# Patient Record
Sex: FEMALE | Race: OTHER | NOT HISPANIC OR LATINO | Employment: UNEMPLOYED | URBAN - METROPOLITAN AREA
[De-identification: names, ages, dates, MRNs, and addresses within clinical notes are randomized per-mention and may not be internally consistent; named-entity substitution may affect disease eponyms.]

---

## 2024-11-14 ENCOUNTER — LAB (OUTPATIENT)
Dept: LAB | Facility: LAB | Age: 49
End: 2024-11-14

## 2024-11-14 ENCOUNTER — APPOINTMENT (OUTPATIENT)
Dept: PRIMARY CARE | Facility: CLINIC | Age: 49
End: 2024-11-14

## 2024-11-14 VITALS
OXYGEN SATURATION: 96 % | HEIGHT: 65 IN | HEART RATE: 95 BPM | WEIGHT: 237 LBS | SYSTOLIC BLOOD PRESSURE: 138 MMHG | BODY MASS INDEX: 39.49 KG/M2 | TEMPERATURE: 98 F | DIASTOLIC BLOOD PRESSURE: 88 MMHG

## 2024-11-14 DIAGNOSIS — Z02.89 HISTORY AND PHYSICAL EXAMINATION, IMMIGRATION: Primary | ICD-10-CM

## 2024-11-14 DIAGNOSIS — Z02.89 HISTORY AND PHYSICAL EXAMINATION, IMMIGRATION: ICD-10-CM

## 2024-11-14 LAB — TREPONEMA PALLIDUM IGG+IGM AB [PRESENCE] IN SERUM OR PLASMA BY IMMUNOASSAY: NONREACTIVE

## 2024-11-14 PROCEDURE — 87491 CHLMYD TRACH DNA AMP PROBE: CPT

## 2024-11-14 PROCEDURE — 36415 COLL VENOUS BLD VENIPUNCTURE: CPT

## 2024-11-14 PROCEDURE — 86780 TREPONEMA PALLIDUM: CPT

## 2024-11-14 PROCEDURE — IMPHY PR IMMIGRATION PHYSICALS: Performed by: INTERNAL MEDICINE

## 2024-11-14 PROCEDURE — 86481 TB AG RESPONSE T-CELL SUSP: CPT

## 2024-11-14 PROCEDURE — 87591 N.GONORRHOEAE DNA AMP PROB: CPT

## 2024-11-14 PROCEDURE — 86787 VARICELLA-ZOSTER ANTIBODY: CPT

## 2024-11-14 ASSESSMENT — ENCOUNTER SYMPTOMS
DIARRHEA: 0
SHORTNESS OF BREATH: 0
CHILLS: 0
WEAKNESS: 0
MYALGIAS: 0
ACTIVITY CHANGE: 0
SORE THROAT: 0
AGITATION: 0
SINUS PRESSURE: 0
PALPITATIONS: 0
CHEST TIGHTNESS: 0
NAUSEA: 0

## 2024-11-14 NOTE — PROGRESS NOTES
Subjective   Patient ID: Citlali Montemayor is a 49 y.o. female who presents for immigration physical assessment.  denies fever, cough, rash, chest pain, diarrhea, or any other symptoms.  denies any recent hospitalizations or institutionalizations.  They deny any psychoactive drug or alcohol use, as well as any issues with psychiatric illnesses.  There is no indication of harmful mental or physical disorders.    HPI    Review of Systems   Constitutional:  Negative for activity change and chills.   HENT:  Negative for congestion, sinus pressure and sore throat.    Respiratory:  Negative for chest tightness and shortness of breath.    Cardiovascular:  Negative for chest pain and palpitations.   Gastrointestinal:  Negative for diarrhea and nausea.   Musculoskeletal:  Negative for myalgias.   Neurological:  Negative for weakness.   Psychiatric/Behavioral:  Negative for agitation and behavioral problems.          Visit Vitals  /88 (BP Location: Right arm, Patient Position: Sitting, BP Cuff Size: Large adult)   Pulse 95   Temp 36.7 °C (98 °F)      Objective   Physical Exam  Constitutional:       Appearance: Normal appearance.   HENT:      Head: Normocephalic and atraumatic.      Nose: Nose normal.      Mouth/Throat:      Mouth: Mucous membranes are moist.   Eyes:      Extraocular Movements: Extraocular movements intact.      Pupils: Pupils are equal, round, and reactive to light.   Cardiovascular:      Rate and Rhythm: Normal rate and regular rhythm.   Pulmonary:      Effort: No respiratory distress.      Breath sounds: No wheezing.   Abdominal:      General: Bowel sounds are normal.      Palpations: Abdomen is soft.   Neurological:      General: No focal deficit present.       Assessment/Plan   Assessment & Plan  History and physical examination, immigration    Orders:  •  Varicella Zoster Antibody, IgG; Future  •  T-Spot TB; Future  •  Syphilis Screen with Reflex; Future  •  C. trachomatis / N. gonorrhoeae,  Amplified; Future    The patient is medically cleared.  There is no evidence of any issues related to intelligence, thought, cognition, judgment, affect, or behavior.  No sign of infectious processes.  Will draw screening laboratories as ordered.

## 2024-11-14 NOTE — ASSESSMENT & PLAN NOTE
Orders:    Varicella Zoster Antibody, IgG; Future    T-Spot TB; Future    Syphilis Screen with Reflex; Future    C. trachomatis / N. gonorrhoeae, Amplified; Future

## 2024-11-15 LAB
C TRACH RRNA SPEC QL NAA+PROBE: NEGATIVE
N GONORRHOEA DNA SPEC QL PROBE+SIG AMP: NEGATIVE
VARICELLA ZOSTER IGG INDEX: 2 IA
VZV IGG SER QL IA: POSITIVE

## 2024-11-16 LAB
NIL(NEG) CONTROL SPOT COUNT: NORMAL
PANEL A SPOT COUNT: 0
PANEL B SPOT COUNT: 0
POS CONTROL SPOT COUNT: NORMAL
T-SPOT. TB INTERPRETATION: NEGATIVE

## 2025-09-16 ENCOUNTER — APPOINTMENT (OUTPATIENT)
Dept: OPHTHALMOLOGY | Facility: CLINIC | Age: 50
End: 2025-09-16
Payer: COMMERCIAL